# Patient Record
Sex: MALE | Race: WHITE | NOT HISPANIC OR LATINO | Employment: FULL TIME | URBAN - METROPOLITAN AREA
[De-identification: names, ages, dates, MRNs, and addresses within clinical notes are randomized per-mention and may not be internally consistent; named-entity substitution may affect disease eponyms.]

---

## 2024-05-20 ENCOUNTER — TELEPHONE (OUTPATIENT)
Dept: NEUROLOGY | Facility: CLINIC | Age: 64
End: 2024-05-20
Payer: COMMERCIAL

## 2024-05-20 NOTE — TELEPHONE ENCOUNTER
Sent several faxes to Dr Ricci's office to request recent study reports. Called [facility] and spoke with Vee, she will fax reports.  Called Andressa at [phone] to have images power shared.

## 2024-05-23 ENCOUNTER — OFFICE VISIT (OUTPATIENT)
Dept: NEUROLOGY | Facility: CLINIC | Age: 64
End: 2024-05-23
Payer: COMMERCIAL

## 2024-05-23 VITALS
WEIGHT: 231.2 LBS | OXYGEN SATURATION: 95 % | HEART RATE: 79 BPM | SYSTOLIC BLOOD PRESSURE: 102 MMHG | DIASTOLIC BLOOD PRESSURE: 62 MMHG | BODY MASS INDEX: 30.5 KG/M2

## 2024-05-23 DIAGNOSIS — Z86.73 HISTORY OF TIA (TRANSIENT ISCHEMIC ATTACK): Primary | ICD-10-CM

## 2024-05-23 RX ORDER — LISINOPRIL AND HYDROCHLOROTHIAZIDE 20; 12.5 MG/1; MG/1
1 TABLET ORAL DAILY
COMMUNITY
Start: 2024-03-21

## 2024-05-23 RX ORDER — ATORVASTATIN CALCIUM 40 MG/1
40 TABLET, FILM COATED ORAL EVERY EVENING
COMMUNITY
Start: 2024-03-11

## 2024-05-23 RX ORDER — ASPIRIN 81 MG/1
81 TABLET, CHEWABLE ORAL DAILY
COMMUNITY
Start: 2024-03-11

## 2024-05-23 RX ORDER — MONTELUKAST SODIUM 10 MG/1
1 TABLET ORAL DAILY
COMMUNITY
Start: 2024-04-16

## 2024-05-23 RX ORDER — LORATADINE 10 MG/1
TABLET ORAL DAILY
COMMUNITY
Start: 2015-04-02

## 2024-05-23 RX ORDER — METFORMIN HYDROCHLORIDE 500 MG/1
1 TABLET, EXTENDED RELEASE ORAL DAILY
COMMUNITY
Start: 2024-03-21

## 2024-05-23 RX ORDER — CETIRIZINE HYDROCHLORIDE 10 MG/1
1 TABLET ORAL DAILY
COMMUNITY
Start: 2024-05-13

## 2024-05-23 RX ORDER — COLCHICINE 0.6 MG/1
1 TABLET ORAL DAILY
COMMUNITY
Start: 2024-03-06

## 2024-05-23 NOTE — PROGRESS NOTES
Notes by MA:  Pt is here today as a referral from Dr Valverde for a mini stroke that he experienced in March.      Subjective:     Dear Dr. Valverde, thank you for referring Mr. Iyer for neurological consultation.  As you know, he is a 63-year-old right-handed gentleman who experienced about 15 minutes of transient right-sided numbness on March 8.  He had a workup at [facility].  His MRI scan of the brain revealed only remote infarct in the left basal ganglia.  He had no significant carotid or intracranial artery stenosis.  I reviewed his laboratory workup.  He had run out of Lipitor about 3 weeks prior to this event.  This has been restarted and increased to 40 mg daily.  He has also been started on low-dose aspirin which he takes daily.  He says that he is working on his blood pressure and diet to better control both his pressure and sugar.  He had no further recurrent events.  Patient ID: Rios Iyer is a 63 y.o. male.    History of Present Illness  The following portions of the patient's history were reviewed and updated as appropriate: allergies, current medications, past family history, past medical history, past social history, past surgical history, and problem list.    Review of Systems   Constitutional:  Negative for activity change, appetite change and fatigue.   HENT:  Negative for facial swelling, trouble swallowing and voice change.    Eyes:  Positive for visual disturbance (floater in R eye since stroke). Negative for photophobia and pain.   Respiratory:  Negative for chest tightness, shortness of breath and wheezing.    Cardiovascular:  Negative for chest pain, palpitations and leg swelling.   Gastrointestinal:  Negative for abdominal pain, nausea and vomiting.   Endocrine: Negative for cold intolerance and heat intolerance.   Musculoskeletal:  Negative for arthralgias, back pain, gait problem, joint swelling, myalgias, neck pain and neck stiffness.   Neurological:  Positive for numbness (head) and  headaches. Negative for dizziness, tremors, seizures, syncope, facial asymmetry, speech difficulty, weakness and light-headedness.   Hematological:  Does not bruise/bleed easily.   Psychiatric/Behavioral:  Positive for agitation, confusion and decreased concentration. Negative for behavioral problems, dysphoric mood, hallucinations, self-injury, sleep disturbance and suicidal ideas. The patient is not nervous/anxious and is not hyperactive.         Objective:    Neurologic Exam  Awake alert pleasant cooperative oriented in all spheres with fluent speech and normal speech comprehension.    Cranial nerves II through XII normal and symmetric.    Motor exam reveals normal symmetric tone bulk and power throughout without drift or spasticity.  No adventitious movements.    The sensory exam reveals reduced sensation to light touch and temperature sensation distally in the lower extremities.    Coordination testing reveals smooth and accurate finger-nose-finger bilaterally, normal heel-to-shin bilaterally and normal rhythmic rapid alternating movements.  Romberg testing is negative.    Tendon reflexes are 1+ and symmetric in the arms, 1+ at the knees and absent at the ankles with equivocal plantar signs bilaterally.  Physical Exam  Neck is supple.  No cervical bruits.  Cardiac rhythm is regular.  No extremity edema.  Assessment/Plan:     Diagnoses and all orders for this visit:    1. History of TIA (transient ischemic attack) (Primary)       63-year-old gentleman with right heminumbness lasting about 15 minutes in early March, consistent with a diagnosis of transient ischemic attack.  I reviewed his workup at [facility].  His primary cerebrovascular risk factors include hypertension, diabetes, hyperlipidemia.  His Lipitor has been appropriately reinitiated and increased to 40 mg daily.  Target LDL will be 70 or less.  I strongly encouraged him to continue to follow your advice on meticulous blood pressure and blood sugar control.   Agree with continued low-dose aspirin for antiplatelet antithrombotic stroke prophylaxis.  Dual antiplatelet therapy is not indicated at this time.  I discussed all the above with the patient and answered his questions today.  Thank you so much for the opportunity to participate in this pleasant gentleman's neurological care.  We would be happy to see him back at any time.

## 2024-12-09 ENCOUNTER — TRANSCRIBE ORDERS (OUTPATIENT)
Dept: ADMINISTRATIVE | Facility: HOSPITAL | Age: 64
End: 2024-12-09
Payer: COMMERCIAL

## 2024-12-10 ENCOUNTER — TRANSCRIBE ORDERS (OUTPATIENT)
Dept: ADMINISTRATIVE | Facility: HOSPITAL | Age: 64
End: 2024-12-10
Payer: COMMERCIAL